# Patient Record
Sex: FEMALE | URBAN - NONMETROPOLITAN AREA
[De-identification: names, ages, dates, MRNs, and addresses within clinical notes are randomized per-mention and may not be internally consistent; named-entity substitution may affect disease eponyms.]

---

## 2021-01-06 ENCOUNTER — APPOINTMENT (RX ONLY)
Dept: URBAN - NONMETROPOLITAN AREA CLINIC 2 | Facility: CLINIC | Age: 41
Setting detail: DERMATOLOGY
End: 2021-01-06

## 2021-01-06 DIAGNOSIS — B02.9 ZOSTER WITHOUT COMPLICATIONS: ICD-10-CM

## 2021-01-06 PROCEDURE — ? PRESCRIPTION

## 2021-01-06 PROCEDURE — 99203 OFFICE O/P NEW LOW 30 MIN: CPT

## 2021-01-06 PROCEDURE — ? ADDITIONAL NOTES

## 2021-01-06 PROCEDURE — ? COUNSELING

## 2021-01-06 RX ORDER — VALACYCLOVIR HYDROCHLORIDE 1 G/1
1 TABLET, FILM COATED ORAL
Qty: 21 | Refills: 0 | Status: ERX | COMMUNITY
Start: 2021-01-06

## 2021-01-06 RX ADMIN — VALACYCLOVIR HYDROCHLORIDE 1: 1 TABLET, FILM COATED ORAL at 00:00

## 2021-01-06 ASSESSMENT — LOCATION SIMPLE DESCRIPTION DERM: LOCATION SIMPLE: LEFT UPPER BACK

## 2021-01-06 ASSESSMENT — LOCATION DETAILED DESCRIPTION DERM: LOCATION DETAILED: LEFT INFERIOR MEDIAL UPPER BACK

## 2021-01-06 ASSESSMENT — LOCATION ZONE DERM: LOCATION ZONE: TRUNK

## 2021-01-06 NOTE — PROCEDURE: ADDITIONAL NOTES
Render Risk Assessment In Note?: no
Detail Level: Simple
Additional Notes: Recommended Sarna lotion for itching sensation